# Patient Record
Sex: FEMALE | URBAN - METROPOLITAN AREA
[De-identification: names, ages, dates, MRNs, and addresses within clinical notes are randomized per-mention and may not be internally consistent; named-entity substitution may affect disease eponyms.]

---

## 2019-01-31 ENCOUNTER — OFFICE VISIT (OUTPATIENT)
Dept: URBAN - METROPOLITAN AREA CLINIC 88 | Facility: CLINIC | Age: 34
End: 2019-01-31
Payer: MEDICAID

## 2019-01-31 DIAGNOSIS — H16.292 OTHER KERATOCONJUNCTIVITIS, LEFT EYE: Primary | ICD-10-CM

## 2019-01-31 DIAGNOSIS — H43.393 OTHER VITREOUS OPACITIES, BILATERAL: ICD-10-CM

## 2019-01-31 PROCEDURE — 99203 OFFICE O/P NEW LOW 30 MIN: CPT | Performed by: OPTOMETRIST

## 2019-01-31 RX ORDER — PREDNISOLONE ACETATE 10 MG/ML
1 % SUSPENSION/ DROPS OPHTHALMIC
Qty: 1 | Refills: 1 | Status: ACTIVE
Start: 2019-01-31

## 2019-01-31 ASSESSMENT — INTRAOCULAR PRESSURE
OS: 17
OD: 17

## 2019-01-31 NOTE — IMPRESSION/PLAN
Impression: Other keratoconjunctivitis, left eye: H16.292. OS. Plan: Discussed diagnosis in detail with patient. Likely viral keratoconjunctivitis based on presentation. Advised patient of condition in 4+ detail and reviewed that viral origin is self limiting. Hand hygiene reviewed. No fever and no pain with eye movement once cornea was anesthestized with topical agent. Discomfort and light sensitivity secondary to cornea involvement. Best acuity OS 20/30 with correction. Consider imaging study of orbit if any worsening of symptoms, change in vision, fever, or pain with eye movement. Supportive therapy with cool compress, PF artifical tears QID OS, Cyclogyl 1% BID and start Prednisolone QID OS. Complete dose of moxifloxacin QID OS as previously rxed until seen with Dr. Silvestre Piña for follow up early/mid next week. RTC if any changes or worsening of symptoms experienced.